# Patient Record
Sex: MALE | Race: BLACK OR AFRICAN AMERICAN | Employment: OTHER | URBAN - METROPOLITAN AREA
[De-identification: names, ages, dates, MRNs, and addresses within clinical notes are randomized per-mention and may not be internally consistent; named-entity substitution may affect disease eponyms.]

---

## 2017-05-04 PROBLEM — C90.02 MULTIPLE MYELOMA IN RELAPSE (HCC): Status: ACTIVE | Noted: 2017-05-04

## 2017-06-26 PROBLEM — E86.0 DEHYDRATION: Status: ACTIVE | Noted: 2017-06-26
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
## 2017-07-24 ENCOUNTER — TELEPHONE (OUTPATIENT)
Dept: RADIATION ONCOLOGY | Age: 62
End: 2017-07-24

## 2017-07-24 RX ORDER — CHLOROPROCAINE HYDROCHLORIDE 20 MG/ML
30 INJECTION, SOLUTION EPIDURAL; INFILTRATION; INTRACAUDAL; PERINEURAL ONCE
Status: CANCELLED | OUTPATIENT
Start: 2017-07-24 | End: 2017-07-24

## 2017-07-24 RX ORDER — SODIUM CHLORIDE, SODIUM LACTATE, POTASSIUM CHLORIDE, CALCIUM CHLORIDE 600; 310; 30; 20 MG/100ML; MG/100ML; MG/100ML; MG/100ML
INJECTION, SOLUTION INTRAVENOUS CONTINUOUS
Status: CANCELLED | OUTPATIENT
Start: 2017-07-24

## 2017-07-25 ENCOUNTER — TELEPHONE (OUTPATIENT)
Dept: RADIATION ONCOLOGY | Age: 62
End: 2017-07-25

## 2017-07-25 ENCOUNTER — HOSPITAL ENCOUNTER (OUTPATIENT)
Dept: SURGERY | Age: 62
Discharge: HOME | End: 2017-07-25
Attending: NEUROLOGICAL SURGERY | Admitting: NEUROLOGICAL SURGERY

## 2017-07-25 VITALS
OXYGEN SATURATION: 97 % | DIASTOLIC BLOOD PRESSURE: 73 MMHG | SYSTOLIC BLOOD PRESSURE: 111 MMHG | WEIGHT: 160 LBS | RESPIRATION RATE: 20 BRPM | HEART RATE: 73 BPM | TEMPERATURE: 97.4 F | HEIGHT: 77 IN | BODY MASS INDEX: 18.89 KG/M2

## 2017-07-25 DIAGNOSIS — C79.31 BRAIN METASTASIS (HCC): ICD-10-CM

## 2017-07-25 PROBLEM — C79.49 SECONDARY MALIGNANT NEOPLASM OF BRAIN AND SPINAL CORD (HCC): Status: ACTIVE | Noted: 2017-07-25

## 2017-07-25 RX ORDER — 0.9 % SODIUM CHLORIDE 0.9 %
500 INTRAVENOUS SOLUTION INTRAVENOUS ONCE
Status: COMPLETED | OUTPATIENT
Start: 2017-07-25 | End: 2017-07-25

## 2017-07-25 RX ORDER — DEXAMETHASONE 4 MG/1
4 TABLET ORAL ONCE
Status: COMPLETED | OUTPATIENT
Start: 2017-07-25 | End: 2017-07-25

## 2017-07-25 RX ORDER — BUPIVACAINE HYDROCHLORIDE 5 MG/ML
30 INJECTION, SOLUTION EPIDURAL; INTRACAUDAL ONCE
Status: COMPLETED | OUTPATIENT
Start: 2017-07-25 | End: 2017-07-25

## 2017-07-25 RX ORDER — LIDOCAINE HYDROCHLORIDE 10 MG/ML
30 INJECTION, SOLUTION EPIDURAL; INFILTRATION; INTRACAUDAL; PERINEURAL ONCE
Status: COMPLETED | OUTPATIENT
Start: 2017-07-25 | End: 2017-07-25

## 2017-07-25 RX ORDER — LEVETIRACETAM 500 MG/1
500 TABLET ORAL ONCE
Status: COMPLETED | OUTPATIENT
Start: 2017-07-25 | End: 2017-07-25

## 2017-07-25 RX ORDER — ONDANSETRON 2 MG/ML
4 INJECTION INTRAMUSCULAR; INTRAVENOUS ONCE
Status: COMPLETED | OUTPATIENT
Start: 2017-07-25 | End: 2017-07-25

## 2017-07-25 RX ORDER — DEXAMETHASONE SODIUM PHOSPHATE 4 MG/ML
10 INJECTION, SOLUTION INTRA-ARTICULAR; INTRALESIONAL; INTRAMUSCULAR; INTRAVENOUS; SOFT TISSUE ONCE
Status: COMPLETED | OUTPATIENT
Start: 2017-07-25 | End: 2017-07-25

## 2017-07-25 RX ADMIN — ONDANSETRON 4 MG: 2 INJECTION INTRAMUSCULAR; INTRAVENOUS at 06:55

## 2017-07-25 RX ADMIN — DEXAMETHASONE 4 MG: 4 TABLET ORAL at 06:53

## 2017-07-25 RX ADMIN — Medication 500 ML: at 06:33

## 2017-07-25 RX ADMIN — LIDOCAINE HYDROCHLORIDE 30 ML: 10 INJECTION, SOLUTION EPIDURAL; INFILTRATION; INTRACAUDAL; PERINEURAL at 07:46

## 2017-07-25 RX ADMIN — DEXAMETHASONE SODIUM PHOSPHATE 10 MG: 4 INJECTION, SOLUTION INTRA-ARTICULAR; INTRALESIONAL; INTRAMUSCULAR; INTRAVENOUS; SOFT TISSUE at 10:13

## 2017-07-25 RX ADMIN — LEVETIRACETAM 500 MG: 500 TABLET ORAL at 07:05

## 2017-07-25 RX ADMIN — BUPIVACAINE HYDROCHLORIDE 150 MG: 5 INJECTION, SOLUTION EPIDURAL; INTRACAUDAL at 07:46

## 2017-07-25 ASSESSMENT — PAIN SCALES - GENERAL: PAINLEVEL_OUTOF10: 10

## 2017-07-26 ENCOUNTER — TELEPHONE (OUTPATIENT)
Dept: RADIATION ONCOLOGY | Age: 62
End: 2017-07-26

## 2017-08-08 PROBLEM — R29.898 WEAKNESS OF BOTH LEGS: Status: ACTIVE | Noted: 2017-08-08

## 2017-08-09 PROBLEM — S06.36AA TRAUMATIC HEMORRHAGE OF CEREBRUM: Status: ACTIVE | Noted: 2017-08-09

## 2017-10-05 ENCOUNTER — HOSPITAL ENCOUNTER (OUTPATIENT)
Dept: MRI IMAGING | Age: 62
Discharge: HOME | End: 2017-10-05
Attending: NEUROLOGICAL SURGERY | Admitting: NEUROLOGICAL SURGERY

## 2017-10-05 DIAGNOSIS — C79.31 SECONDARY MALIGNANT NEOPLASM OF BRAIN (HCC): ICD-10-CM

## 2017-10-05 DIAGNOSIS — C79.31 METASTASIS TO BRAIN (HCC): ICD-10-CM

## 2017-10-26 ENCOUNTER — TELEPHONE (OUTPATIENT)
Dept: RADIATION ONCOLOGY | Age: 62
End: 2017-10-26

## 2017-10-26 NOTE — TELEPHONE ENCOUNTER
Called patients son to check on patient. He missed appointment for 3 month [facility] follow up with Dr. Analia Thomas. Son stated that his father was in a rehab facility and is expected to  Be discharged on Nov. 3.  He asked that we call his wife Scar Gomes as she makes all appointments for his father. Chelsi's number is [phone]. Will make Dr. Analia Thomas aware.     Todd Nurse, RN

## 2017-11-17 ENCOUNTER — OFFICE VISIT (OUTPATIENT)
Dept: SURGERY | Age: 62
End: 2017-11-17

## 2017-11-17 VITALS
SYSTOLIC BLOOD PRESSURE: 90 MMHG | HEIGHT: 76 IN | BODY MASS INDEX: 17.66 KG/M2 | WEIGHT: 145 LBS | DIASTOLIC BLOOD PRESSURE: 60 MMHG

## 2017-11-17 DIAGNOSIS — K13.0 LIP MASS: Primary | ICD-10-CM

## 2017-11-17 PROCEDURE — 11100 PR BIOPSY OF SKIN LESION: CPT | Performed by: SURGERY

## 2017-11-17 RX ORDER — DOXYCYCLINE HYCLATE 100 MG/1
100 CAPSULE ORAL 2 TIMES DAILY
COMMUNITY

## 2017-11-17 ASSESSMENT — ENCOUNTER SYMPTOMS
ABDOMINAL DISTENTION: 0
EYE DISCHARGE: 0
FACIAL SWELLING: 1
ABDOMINAL PAIN: 0
WHEEZING: 1
SHORTNESS OF BREATH: 1
EYE ITCHING: 0
BACK PAIN: 0
COUGH: 1

## 2017-11-17 NOTE — Clinical Note
Thank you for sending Mohsen Silverio Krish. Encountered solid mass rather than fluid filled abscess when opened lip mass.  Sent a piece to pathology to rule out malignancy

## 2017-11-17 NOTE — PROGRESS NOTES
ALPRAZolam (XANAX) 1 MG tablet Take 1 tablet by mouth 2 times daily as needed for Sleep or Anxiety 60 tablet 5    levETIRAcetam (KEPPRA) 500 MG tablet Take 1 tablet by mouth 2 times daily Start on 7/22 60 tablet 3    Docusate Sodium 100 MG TABS Take 100 tablets by mouth 2 times daily as needed (constipation) 60 tablet 3    escitalopram (LEXAPRO) 20 MG tablet Take 2 tablets by mouth daily 60 tablet 5    prochlorperazine (COMPAZINE) 10 MG tablet Take 1 tablet by mouth every 6 hours as needed (NAUSEA) 60 tablet 3    megestrol (MEGACE) 40 MG/ML suspension Take 10 mLs by mouth daily 240 mL 3    Nutritional Supplements (ENSURE NUTRITION SHAKE PO) Take by mouth      ondansetron (ZOFRAN) 4 MG tablet Take 1 tablet by mouth every 6 hours as needed for Nausea or Vomiting 60 tablet 3    ADVAIR -21 MCG/ACT inhaler USE 2 PUFFS BY MOUTH TWICE DAILY 12 g 0    albuterol (PROVENTIL HFA;VENTOLIN HFA) 108 (90 BASE) MCG/ACT inhaler Inhale 2 puffs into the lungs every 6 hours as needed for Wheezing or Shortness of Breath 1 Inhaler 3     No current facility-administered medications for this visit. Facility-Administered Medications Ordered in Other Visits   Medication Dose Route Frequency Provider Last Rate Last Dose    0.9 % sodium chloride bolus  250 mL Intravenous Once Storm Covington MD          No Known Allergies     OBJECTIVE:  Ht 6' 4\" (1.93 m)    Physical Exam   Constitutional: He is oriented to person, place, and time. He appears well-developed and well-nourished. HENT:   Head: Normocephalic and atraumatic. Eyes: Pupils are equal, round, and reactive to light. Neck: Normal range of motion. Neck supple. Neurological: He is alert and oriented to person, place, and time. Skin: Skin is warm and dry. Psychiatric: He has a normal mood and affect. His behavior is normal. Judgment and thought content normal.        Labs:  No visits with results within 6 Week(s) from this visit.    Latest known 5.1 mmol/L Final    Chloride 08/14/2017 94* 99 - 110 mmol/L Final    CO2 08/14/2017 30  21 - 32 mmol/L Final    Anion Gap 08/14/2017 11  3 - 16 Final    Glucose 08/14/2017 126* 70 - 99 mg/dL Final    BUN 08/14/2017 16  7 - 20 mg/dL Final    CREATININE 08/14/2017 0.8  0.8 - 1.3 mg/dL Final    GFR Non- 08/14/2017 >60  >60 Final    Comment: >60 mL/min/1.73m2 EGFR, calc. for ages 25 and older using the  MDRD formula (not corrected for weight), is valid for stable  renal function.  GFR  08/14/2017 >60  >60 Final    Comment: Chronic Kidney Disease: less than 60 ml/min/1.73 sq.m. Kidney Failure: less than 15 ml/min/1.73 sq.m. Results valid for patients 18 years and older.       Calcium 08/14/2017 9.6  8.3 - 10.6 mg/dL Final    WBC 08/14/2017 8.8  4.0 - 11.0 K/uL Final    RBC 08/14/2017 4.09* 4.20 - 5.90 M/uL Final    Hemoglobin 08/14/2017 12.7* 13.5 - 17.5 g/dL Final    Hematocrit 08/14/2017 37.7* 40.5 - 52.5 % Final    MCV 08/14/2017 92.1  80.0 - 100.0 fL Final    MCH 08/14/2017 31.0  26.0 - 34.0 pg Final    MCHC 08/14/2017 33.7  31.0 - 36.0 g/dL Final    RDW 08/14/2017 16.9* 12.4 - 15.4 % Final    Platelets 37/86/2007 156  135 - 450 K/uL Final    MPV 08/14/2017 6.5  5.0 - 10.5 fL Final    Neutrophils % 08/14/2017 82.0  % Final    Lymphocytes % 08/14/2017 8.0  % Final    Monocytes % 08/14/2017 6.0  % Final    Eosinophils % 08/14/2017 0.0  % Final    Basophils % 08/14/2017 1.0  % Final    Neutrophils # 08/14/2017 7.5  1.7 - 7.7 K/uL Final    Lymphocytes # 08/14/2017 0.7* 1.0 - 5.1 K/uL Final    Monocytes # 08/14/2017 0.5  0.0 - 1.3 K/uL Final    Eosinophils # 08/14/2017 0.0  0.0 - 0.6 K/uL Final    Basophils # 08/14/2017 0.1  0.0 - 0.2 K/uL Final    Metamyelocytes Relative 08/14/2017 3* % Final    Anisocytosis 08/14/2017 1+*  Final    Sodium 08/17/2017 130* 136 - 145 mmol/L Final    Potassium 08/17/2017 4.9  3.5 - 5.1 mmol/L Final    Chloride 08/17/2017 91* 99 - 110 mmol/L Final    CO2 08/17/2017 29  21 - 32 mmol/L Final    Anion Gap 08/17/2017 10  3 - 16 Final    Glucose 08/17/2017 123* 70 - 99 mg/dL Final    BUN 08/17/2017 21* 7 - 20 mg/dL Final    CREATININE 08/17/2017 0.7* 0.8 - 1.3 mg/dL Final    GFR Non- 08/17/2017 >60  >60 Final    Comment: >60 mL/min/1.73m2 EGFR, calc. for ages 25 and older using the  MDRD formula (not corrected for weight), is valid for stable  renal function.  GFR  08/17/2017 >60  >60 Final    Comment: Chronic Kidney Disease: less than 60 ml/min/1.73 sq.m. Kidney Failure: less than 15 ml/min/1.73 sq.m. Results valid for patients 18 years and older.       Calcium 08/17/2017 9.4  8.3 - 10.6 mg/dL Final    WBC 08/17/2017 12.3* 4.0 - 11.0 K/uL Final    RBC 08/17/2017 4.28  4.20 - 5.90 M/uL Final    Hemoglobin 08/17/2017 13.2* 13.5 - 17.5 g/dL Final    Hematocrit 08/17/2017 39.7* 40.5 - 52.5 % Final    MCV 08/17/2017 92.8  80.0 - 100.0 fL Final    MCH 08/17/2017 30.8  26.0 - 34.0 pg Final    MCHC 08/17/2017 33.2  31.0 - 36.0 g/dL Final    RDW 08/17/2017 18.2* 12.4 - 15.4 % Final    Platelets 83/06/7333 177  135 - 450 K/uL Final    MPV 08/17/2017 6.4  5.0 - 10.5 fL Final    Path Consult 08/17/2017 Yes   Final    Neutrophils % 08/17/2017 69.0  % Final    Lymphocytes % 08/17/2017 7.0  % Final    Monocytes % 08/17/2017 11.0  % Final    Eosinophils % 08/17/2017 0.0  % Final    Basophils % 08/17/2017 0.0  % Final    Neutrophils # 08/17/2017 10.1* 1.7 - 7.7 K/uL Final    Lymphocytes # 08/17/2017 0.9* 1.0 - 5.1 K/uL Final    Monocytes # 08/17/2017 1.4* 0.0 - 1.3 K/uL Final    Eosinophils # 08/17/2017 0.0  0.0 - 0.6 K/uL Final    Basophils # 08/17/2017 0.0  0.0 - 0.2 K/uL Final    Metamyelocytes Relative 08/17/2017 9* % Final    Myelocytes Relative 08/17/2017 4* % Final    nRBC 08/17/2017 1* /100 WBC Final    Color, UA 08/17/2017 Yellow  Straw/Yellow Final   

## 2017-11-22 ENCOUNTER — HOSPITAL ENCOUNTER (OUTPATIENT)
Dept: OTHER | Age: 62
Discharge: HOME | End: 2017-11-22
Attending: INTERNAL MEDICINE | Admitting: INTERNAL MEDICINE

## 2017-11-27 ENCOUNTER — TELEPHONE (OUTPATIENT)
Dept: SURGERY | Age: 62
End: 2017-11-27

## 2018-09-26 PROBLEM — E86.0 DEHYDRATION: Status: RESOLVED | Noted: 2017-06-26 | Resolved: 2018-09-26
